# Patient Record
Sex: MALE | Race: WHITE | URBAN - METROPOLITAN AREA
[De-identification: names, ages, dates, MRNs, and addresses within clinical notes are randomized per-mention and may not be internally consistent; named-entity substitution may affect disease eponyms.]

---

## 2023-10-02 ENCOUNTER — HOSPITAL ENCOUNTER (EMERGENCY)
Facility: HOSPITAL | Age: 53
Discharge: HOME | End: 2023-10-02
Attending: EMERGENCY MEDICINE

## 2023-10-02 ENCOUNTER — APPOINTMENT (OUTPATIENT)
Dept: RADIOLOGY | Facility: HOSPITAL | Age: 53
End: 2023-10-02

## 2023-10-02 VITALS
SYSTOLIC BLOOD PRESSURE: 134 MMHG | OXYGEN SATURATION: 99 % | BODY MASS INDEX: 30.45 KG/M2 | WEIGHT: 194 LBS | DIASTOLIC BLOOD PRESSURE: 84 MMHG | HEIGHT: 67 IN | TEMPERATURE: 97.7 F | HEART RATE: 68 BPM | RESPIRATION RATE: 18 BRPM

## 2023-10-02 DIAGNOSIS — R07.89 OTHER CHEST PAIN: Primary | ICD-10-CM

## 2023-10-02 LAB
ALBUMIN SERPL-MCNC: 4 G/DL (ref 3.5–5)
ALP BLD-CCNC: 76 U/L (ref 35–125)
ALT SERPL-CCNC: 18 U/L (ref 5–40)
ANION GAP SERPL CALC-SCNC: 10 MMOL/L
AST SERPL-CCNC: 16 U/L (ref 5–40)
BASOPHILS # BLD AUTO: 0.07 X10*3/UL (ref 0–0.1)
BASOPHILS NFR BLD AUTO: 0.6 %
BILIRUB SERPL-MCNC: 0.2 MG/DL (ref 0.1–1.2)
BUN SERPL-MCNC: 9 MG/DL (ref 8–25)
CALCIUM SERPL-MCNC: 9.1 MG/DL (ref 8.5–10.4)
CHLORIDE SERPL-SCNC: 104 MMOL/L (ref 97–107)
CO2 SERPL-SCNC: 25 MMOL/L (ref 24–31)
CREAT SERPL-MCNC: 0.8 MG/DL (ref 0.4–1.6)
EOSINOPHIL # BLD AUTO: 0.08 X10*3/UL (ref 0–0.7)
EOSINOPHIL NFR BLD AUTO: 0.7 %
ERYTHROCYTE [DISTWIDTH] IN BLOOD BY AUTOMATED COUNT: 13.8 % (ref 11.5–14.5)
GFR SERPL CREATININE-BSD FRML MDRD: >90 ML/MIN/1.73M*2
GLUCOSE SERPL-MCNC: 94 MG/DL (ref 65–99)
HCT VFR BLD AUTO: 52.1 % (ref 41–52)
HGB BLD-MCNC: 17 G/DL (ref 13.5–17.5)
IMM GRANULOCYTES # BLD AUTO: 0.08 X10*3/UL (ref 0–0.7)
IMM GRANULOCYTES NFR BLD AUTO: 0.7 % (ref 0–0.9)
LIPASE SERPL-CCNC: <16 U/L (ref 16–63)
LYMPHOCYTES # BLD AUTO: 2.43 X10*3/UL (ref 1.2–4.8)
LYMPHOCYTES NFR BLD AUTO: 20.1 %
MCH RBC QN AUTO: 29.6 PG (ref 26–34)
MCHC RBC AUTO-ENTMCNC: 32.6 G/DL (ref 32–36)
MCV RBC AUTO: 91 FL (ref 80–100)
MONOCYTES # BLD AUTO: 0.81 X10*3/UL (ref 0.1–1)
MONOCYTES NFR BLD AUTO: 6.7 %
NEUTROPHILS # BLD AUTO: 8.62 X10*3/UL (ref 1.2–7.7)
NEUTROPHILS NFR BLD AUTO: 71.2 %
NRBC BLD-RTO: 0 /100 WBCS (ref 0–0)
PLATELET # BLD AUTO: 349 X10*3/UL (ref 150–450)
PMV BLD AUTO: 9.8 FL (ref 7.5–11.5)
POTASSIUM SERPL-SCNC: 4.4 MMOL/L (ref 3.4–5.1)
PROT SERPL-MCNC: 6.5 G/DL (ref 5.9–7.9)
RBC # BLD AUTO: 5.74 X10*6/UL (ref 4.5–5.9)
SODIUM SERPL-SCNC: 139 MMOL/L (ref 133–145)
TROPONIN T SERPL-MCNC: <6 NG/L
WBC # BLD AUTO: 12.1 X10*3/UL (ref 4.4–11.3)

## 2023-10-02 PROCEDURE — 85025 COMPLETE CBC W/AUTO DIFF WBC: CPT | Performed by: EMERGENCY MEDICINE

## 2023-10-02 PROCEDURE — 71046 X-RAY EXAM CHEST 2 VIEWS: CPT

## 2023-10-02 PROCEDURE — 36415 COLL VENOUS BLD VENIPUNCTURE: CPT | Performed by: EMERGENCY MEDICINE

## 2023-10-02 PROCEDURE — 2500000004 HC RX 250 GENERAL PHARMACY W/ HCPCS (ALT 636 FOR OP/ED): Performed by: EMERGENCY MEDICINE

## 2023-10-02 PROCEDURE — C9113 INJ PANTOPRAZOLE SODIUM, VIA: HCPCS | Performed by: EMERGENCY MEDICINE

## 2023-10-02 PROCEDURE — 99284 EMERGENCY DEPT VISIT MOD MDM: CPT | Performed by: EMERGENCY MEDICINE

## 2023-10-02 PROCEDURE — 84484 ASSAY OF TROPONIN QUANT: CPT | Performed by: EMERGENCY MEDICINE

## 2023-10-02 PROCEDURE — 99284 EMERGENCY DEPT VISIT MOD MDM: CPT | Mod: 25

## 2023-10-02 PROCEDURE — 2500000001 HC RX 250 WO HCPCS SELF ADMINISTERED DRUGS (ALT 637 FOR MEDICARE OP): Performed by: EMERGENCY MEDICINE

## 2023-10-02 PROCEDURE — 96374 THER/PROPH/DIAG INJ IV PUSH: CPT

## 2023-10-02 PROCEDURE — 80053 COMPREHEN METABOLIC PANEL: CPT | Performed by: EMERGENCY MEDICINE

## 2023-10-02 PROCEDURE — 83690 ASSAY OF LIPASE: CPT | Performed by: EMERGENCY MEDICINE

## 2023-10-02 RX ORDER — NAPROXEN SODIUM 220 MG/1
324 TABLET, FILM COATED ORAL ONCE
Status: COMPLETED | OUTPATIENT
Start: 2023-10-02 | End: 2023-10-02

## 2023-10-02 RX ORDER — PANTOPRAZOLE SODIUM 40 MG/10ML
40 INJECTION, POWDER, LYOPHILIZED, FOR SOLUTION INTRAVENOUS ONCE
Status: COMPLETED | OUTPATIENT
Start: 2023-10-02 | End: 2023-10-02

## 2023-10-02 RX ADMIN — PANTOPRAZOLE SODIUM 40 MG: 40 INJECTION, POWDER, FOR SOLUTION INTRAVENOUS at 09:24

## 2023-10-02 RX ADMIN — ASPIRIN 81 MG CHEWABLE TABLET 324 MG: 81 TABLET CHEWABLE at 09:24

## 2023-10-02 ASSESSMENT — PAIN DESCRIPTION - DESCRIPTORS
DESCRIPTORS: CRAMPING
DESCRIPTORS: SHARP

## 2023-10-02 ASSESSMENT — LIFESTYLE VARIABLES
HAVE PEOPLE ANNOYED YOU BY CRITICIZING YOUR DRINKING: NO
EVER HAD A DRINK FIRST THING IN THE MORNING TO STEADY YOUR NERVES TO GET RID OF A HANGOVER: NO
HAVE YOU EVER FELT YOU SHOULD CUT DOWN ON YOUR DRINKING: NO
EVER FELT BAD OR GUILTY ABOUT YOUR DRINKING: NO

## 2023-10-02 ASSESSMENT — COLUMBIA-SUICIDE SEVERITY RATING SCALE - C-SSRS
2. HAVE YOU ACTUALLY HAD ANY THOUGHTS OF KILLING YOURSELF?: NO
1. IN THE PAST MONTH, HAVE YOU WISHED YOU WERE DEAD OR WISHED YOU COULD GO TO SLEEP AND NOT WAKE UP?: NO
6. HAVE YOU EVER DONE ANYTHING, STARTED TO DO ANYTHING, OR PREPARED TO DO ANYTHING TO END YOUR LIFE?: NO

## 2023-10-02 ASSESSMENT — PAIN SCALES - GENERAL
PAINLEVEL_OUTOF10: 0 - NO PAIN
PAINLEVEL_OUTOF10: 0 - NO PAIN
PAINLEVEL_OUTOF10: 7

## 2023-10-02 ASSESSMENT — PAIN DESCRIPTION - LOCATION: LOCATION: CHEST

## 2023-10-02 ASSESSMENT — PAIN DESCRIPTION - ORIENTATION: ORIENTATION: LEFT

## 2023-10-02 ASSESSMENT — PAIN DESCRIPTION - DIRECTION: RADIATING_TOWARDS: LEFT SIDE OF CHEST

## 2023-10-02 ASSESSMENT — PAIN DESCRIPTION - PROGRESSION: CLINICAL_PROGRESSION: NOT CHANGED

## 2023-10-02 ASSESSMENT — PAIN DESCRIPTION - ONSET: ONSET: ONGOING

## 2023-10-02 ASSESSMENT — PAIN - FUNCTIONAL ASSESSMENT
PAIN_FUNCTIONAL_ASSESSMENT: 0-10
PAIN_FUNCTIONAL_ASSESSMENT: 0-10

## 2023-10-02 ASSESSMENT — PAIN DESCRIPTION - FREQUENCY: FREQUENCY: CONSTANT/CONTINUOUS

## 2023-10-02 ASSESSMENT — PAIN DESCRIPTION - PAIN TYPE: TYPE: ACUTE PAIN

## 2023-10-02 NOTE — ED PROVIDER NOTES
"HPI   Chief Complaint   Patient presents with    Chest Pain     Chest pain, started 1 hour ago, pointing to left side of chest.  Pain 7/10.  \"Feels like a Sandeep Horse\"       53-year-old male presenting to emergency department with a chief complaint of 1 hour of chest discomfort.  More in the epigastric region.  Denies any significant cardiac history.  He is a smoker.  Denies lightness dizziness shortness of breath numbness weakness.  All appear nontoxic. no Other complaint.                          No data recorded                Patient History   History reviewed. No pertinent past medical history.  Past Surgical History:   Procedure Laterality Date    HERNIA REPAIR       No family history on file.  Social History     Tobacco Use    Smoking status: Every Day     Packs/day: 1.00     Years: 37.00     Additional pack years: 0.00     Total pack years: 37.00     Types: Cigarettes    Smokeless tobacco: Never   Vaping Use    Vaping Use: Never used   Substance Use Topics    Alcohol use: Not on file    Drug use: Never       Physical Exam   ED Triage Vitals [10/02/23 0858]   Temp Heart Rate Resp BP   36.5 °C (97.7 °F) 86 20 132/87      SpO2 Temp Source Heart Rate Source Patient Position   100 % Temporal Monitor Sitting      BP Location FiO2 (%)     Left arm --       Physical Exam  Constitutional:       Appearance: He is well-developed and normal weight.   HENT:      Head: Normocephalic and atraumatic.   Cardiovascular:      Rate and Rhythm: Normal rate and regular rhythm.      Heart sounds: Normal heart sounds.   Pulmonary:      Effort: Pulmonary effort is normal.      Breath sounds: Normal breath sounds.   Abdominal:      General: Bowel sounds are normal.      Palpations: Abdomen is soft.   Musculoskeletal:         General: Normal range of motion.      Cervical back: Normal range of motion.   Skin:     General: Skin is warm and dry.   Neurological:      General: No focal deficit present.      Mental Status: He is alert " and oriented to person, place, and time.   Psychiatric:         Mood and Affect: Mood normal.         ED Course & MDM   ED Course as of 10/02/23 1534   Mon Oct 02, 2023   1247 Troponin T, High Sensitivity: <6 [VALENTINO]      ED Course User Index  [VALENTINO] Danielle Meek MD         Diagnoses as of 10/02/23 1534   Other chest pain       Medical Decision Making  CBC without significant leukocytosis or anemia, metabolic panel without significant renal impairment or electrolyte abnormality.  Troponin within an appropriate range without significant delta change, chest x-ray without actionable cardiopulmonary process, EKG without evidence of acute ischemia.  Patient with heart score less than 3.  Referred to cardiology.  Released in good condition.    Amount and/or Complexity of Data Reviewed  Independent Historian: parent  Labs: ordered.  Radiology: ordered and independent interpretation performed.  ECG/medicine tests: ordered and independent interpretation performed.        Procedure  Procedures     Lester Leon PA-C  10/02/23 1533

## 2023-10-02 NOTE — Clinical Note
Zoltan Blue was seen and treated in our emergency department on 10/2/2023.  He may return to work on 10/03/2023.       If you have any questions or concerns, please don't hesitate to call.      Danielle Meek MD

## 2023-10-02 NOTE — ED TRIAGE NOTES
"Patient presents to ED with left sided chest pain, stated while driving, he developed left sided chest pain, referred to pain as a \"Sandeep Horse\" in chest.  Felt nauseated.  "

## 2023-10-02 NOTE — ED PROVIDER NOTES
The patient was seen in conjunction with the advanced practice provider, and I performed a substantive portion of the visit.  All medical decision making was performed by me.  If applicable, all laboratory studies, radiology, and EKGs were reviewed by me.     History: 53-year-old male with no significant past medical history, daily smoker presents for evaluation of left-sided chest pain that he describes as a squeezing sensation associated with some mild shortness of breath and nausea while he was driving today.  States it has been ongoing for the past 1 hour.  No cardiac history.  States initially he thought it might be heartburn as he is having some epigastric discomfort as well.  Physical exam:  Constitutional:  Awake, alert, well appearing, nontoxic  HEENT:  Normocephalic, atraumatic  Neck: Trachea midline, no stridor  Respiratory/Chest:  Clear to auscultation bilaterally, no wheezes, rhonchi, or rales  CV:  Regular rate and regular rhythm, no murmurs, gallops, or rubs  Abdomen:  Soft, tenderness in the epigastrium and left subcostal region, nondistended, normal bowel sounds, no peritoneal signs  Skin:  Warm and dry  EKG on my independent review: Normal sinus rhythm 80 bpm, normal axis, normal intervals, no acute ST or T wave abnormalities    MDM: Patient presents for chest pain.  History obtained from the patient. Based on patient history, exam, and workup I estimate there is a low risk for diagnostic considerations including, but not limited to pulmonary embolism, aortic dissection, pneumothorax.  ACS one of the highest considerations.  Patient has nondiagnostic EKG, troponin negative x2.  Chest x-ray on my independent review and compare by Radiology with no acute cardiopulmonary process.  I have considered admission/hospitalization for further workup and management of acute coronary syndrome patient has  low risk heart score of 2 thus I considered the discharge disposition reasonable.      Suspected diagnosis  and risks discussed with the patient including the utility of the Heart score, and after shared decision-making discussion agreed with discharging home to follow up closely with primary care doctor or cardiologist.  Also discussed return instructions including the symptoms which are most concerning including difficulty breathing, exertional chest pain associated with SOB, nausea, or diaphoresis, and changing or worsening symptoms that would necessitate immediate return.        Danielle Meek MD  10/02/23 0241

## 2023-10-11 ENCOUNTER — HOSPITAL ENCOUNTER (OUTPATIENT)
Dept: CARDIOLOGY | Facility: HOSPITAL | Age: 53
Discharge: HOME | End: 2023-10-11

## 2023-10-11 LAB
ATRIAL RATE: 80 BPM
P AXIS: 64 DEGREES
P OFFSET: 217 MS
P ONSET: 152 MS
PR INTERVAL: 152 MS
Q ONSET: 228 MS
QRS COUNT: 13 BEATS
QRS DURATION: 80 MS
QT INTERVAL: 366 MS
QTC CALCULATION(BAZETT): 422 MS
QTC FREDERICIA: 403 MS
R AXIS: 64 DEGREES
T AXIS: 48 DEGREES
T OFFSET: 411 MS
VENTRICULAR RATE: 80 BPM

## 2023-10-11 PROCEDURE — 93005 ELECTROCARDIOGRAM TRACING: CPT

## 2024-05-27 ENCOUNTER — HOSPITAL ENCOUNTER (EMERGENCY)
Facility: HOSPITAL | Age: 54
Discharge: HOME | End: 2024-05-27
Attending: STUDENT IN AN ORGANIZED HEALTH CARE EDUCATION/TRAINING PROGRAM

## 2024-05-27 VITALS
RESPIRATION RATE: 18 BRPM | HEART RATE: 74 BPM | TEMPERATURE: 97.3 F | HEIGHT: 67 IN | DIASTOLIC BLOOD PRESSURE: 87 MMHG | BODY MASS INDEX: 28.25 KG/M2 | WEIGHT: 180 LBS | SYSTOLIC BLOOD PRESSURE: 127 MMHG | OXYGEN SATURATION: 99 %

## 2024-05-27 DIAGNOSIS — F07.81 POST CONCUSSIVE SYNDROME: Primary | ICD-10-CM

## 2024-05-27 PROCEDURE — 99283 EMERGENCY DEPT VISIT LOW MDM: CPT

## 2024-05-27 PROCEDURE — 99284 EMERGENCY DEPT VISIT MOD MDM: CPT | Performed by: STUDENT IN AN ORGANIZED HEALTH CARE EDUCATION/TRAINING PROGRAM

## 2024-05-27 PROCEDURE — 2500000001 HC RX 250 WO HCPCS SELF ADMINISTERED DRUGS (ALT 637 FOR MEDICARE OP)

## 2024-05-27 RX ORDER — SUMATRIPTAN 50 MG/1
50 TABLET, FILM COATED ORAL ONCE
Status: COMPLETED | OUTPATIENT
Start: 2024-05-27 | End: 2024-05-27

## 2024-05-27 RX ORDER — BUTALBITAL, ACETAMINOPHEN AND CAFFEINE 50; 325; 40 MG/1; MG/1; MG/1
1 TABLET ORAL EVERY 6 HOURS PRN
Qty: 3 TABLET | Refills: 0 | Status: SHIPPED | OUTPATIENT
Start: 2024-05-27

## 2024-05-27 RX ORDER — ONDANSETRON 4 MG/1
4 TABLET, ORALLY DISINTEGRATING ORAL EVERY 8 HOURS PRN
Qty: 20 TABLET | Refills: 0 | Status: SHIPPED | OUTPATIENT
Start: 2024-05-27 | End: 2024-06-03

## 2024-05-27 RX ORDER — IBUPROFEN 600 MG/1
600 TABLET ORAL ONCE
Status: COMPLETED | OUTPATIENT
Start: 2024-05-27 | End: 2024-05-27

## 2024-05-27 RX ADMIN — SUMATRIPTAN SUCCINATE 50 MG: 50 TABLET ORAL at 13:03

## 2024-05-27 RX ADMIN — IBUPROFEN 600 MG: 600 TABLET, FILM COATED ORAL at 12:22

## 2024-05-27 ASSESSMENT — PAIN SCALES - GENERAL
PAINLEVEL_OUTOF10: 8
PAINLEVEL_OUTOF10: 9
PAINLEVEL_OUTOF10: 9

## 2024-05-27 ASSESSMENT — PAIN DESCRIPTION - PAIN TYPE: TYPE: ACUTE PAIN

## 2024-05-27 ASSESSMENT — LIFESTYLE VARIABLES
HAVE YOU EVER FELT YOU SHOULD CUT DOWN ON YOUR DRINKING: NO
EVER FELT BAD OR GUILTY ABOUT YOUR DRINKING: NO
TOTAL SCORE: 0
EVER HAD A DRINK FIRST THING IN THE MORNING TO STEADY YOUR NERVES TO GET RID OF A HANGOVER: NO
HAVE PEOPLE ANNOYED YOU BY CRITICIZING YOUR DRINKING: NO

## 2024-05-27 ASSESSMENT — PAIN - FUNCTIONAL ASSESSMENT
PAIN_FUNCTIONAL_ASSESSMENT: 0-10
PAIN_FUNCTIONAL_ASSESSMENT: 0-10

## 2024-05-27 ASSESSMENT — PAIN DESCRIPTION - ORIENTATION: ORIENTATION: POSTERIOR

## 2024-05-27 ASSESSMENT — COLUMBIA-SUICIDE SEVERITY RATING SCALE - C-SSRS
6. HAVE YOU EVER DONE ANYTHING, STARTED TO DO ANYTHING, OR PREPARED TO DO ANYTHING TO END YOUR LIFE?: NO
1. IN THE PAST MONTH, HAVE YOU WISHED YOU WERE DEAD OR WISHED YOU COULD GO TO SLEEP AND NOT WAKE UP?: NO
2. HAVE YOU ACTUALLY HAD ANY THOUGHTS OF KILLING YOURSELF?: NO

## 2024-05-27 ASSESSMENT — PAIN DESCRIPTION - LOCATION
LOCATION: HEAD
LOCATION: HEAD

## 2024-05-27 NOTE — Clinical Note
Zoltan Blue was seen and treated in our emergency department on 5/27/2024.  He may return to work on 06/03/2024.  He was diagnosed with post-concussive syndrome and will need re-evaluation by a physician to clear patient for return to work.     If you have any questions or concerns, please don't hesitate to call.      Sammy Yipoffer, DO

## 2024-05-27 NOTE — ED PROVIDER NOTES
HPI   Chief Complaint   Patient presents with    Headache     Pt states that he hit his head 2 weeks ago. Was seen at [place] Saturday. Reports worsening headache.          HPI  Patient is a 54-year-old male presenting to [facility] ED with a variety of symptoms including headache, blurred vision, lightheadedness, nausea, and intermittent shortness of breath.  [place]  a bag and slammed the back of his head into the top of the door.  He has had intermittent headaches since then and worsening visual symptoms and brain fog.  He was seen at [facility] on 5/25 where they did a CT head which was negative as well as a cardiac workup that was negative.  He was diagnosed with postconcussive syndrome.  They attempted to try Tylenol but did not help his headache.  He was discharged with instructions to follow-up with neurology.  His symptoms have not gotten any better and came back to the emergency department.  He is currently not having any chest pain, shortness of breath, weakness or sensory changes, nausea or vomiting.                  [place] Coma Scale Score: 15                     Patient History   History reviewed. No pertinent past medical history.  Past Surgical History:   Procedure Laterality Date    HERNIA REPAIR       No family history on file.  Social History     Tobacco Use    Smoking status: Every Day     Current packs/day: 1.00     Average packs/day: 1 pack/day for 37.0 years (37.0 ttl pk-yrs)     Types: Cigarettes    Smokeless tobacco: Never   Vaping Use    Vaping status: Never Used   Substance Use Topics    Alcohol use: Never    Drug use: Never       Physical Exam   ED Triage Vitals [05/27/24 1119]   Temperature Heart Rate Respirations BP   36.3 °C (97.3 °F) 80 18 140/83      Pulse Ox Temp Source Heart Rate Source Patient Position   100 % Temporal -- Sitting      BP Location FiO2 (%)     Right arm --       Physical  Exam  Constitutional: A&Ox4, NAD, resting comfortable   Head and Face: Atraumatic, normocephalic   Eyes: Normal external exam, EOMI  ENT: Normal external inspection of ears and nose. Oropharynx normal.  Cardiovascular: RRR, S1/S2, no murmurs, rubs, or gallops, radial pulses +2  Pulmonary: CTAB, no respiratory distress, no wheezing, rales or rhonchi, on RA  Abdomen: +BS, soft, non-tender, nondistended, no guarding rigidity or rebound tenderness, no masses noted  MSK: Negative for edema, No joint swelling, normal movements of all extremities.   Neuro: No nystagmus, cranial nerves II through XII intact grossly, no limb ataxia, gait is stable and normal, no focal deficits, normal motor function, normal sensation, follows all commands  Skin- No lesions, contusions, or erythema.  Psychiatric: Judgment intact. Appropriate mood, affect and behavior    ED Course & MDM   Diagnoses as of 05/27/24 1222   Post concussive syndrome   Patient is a 54-year-old male presenting to [facility] ED with a variety of symptoms including headache, blurred vision, lightheadedness, nausea, and intermittent shortness of breath.  Patient's symptoms most consistent with continued post concussion syndrome.    Patient presented hemodynamically stable.  Was seen at [facility] 5/25 where they did an extensive workup including a CT head which was negative for any acute intracranial pathology as well as chest x-ray and cardiac workup which was negative.  They did a D-dimer and duplex ultrasound which was negative ruling out any PE with patient's atypical complaints of chest pain and shortness of breath at the hospital there.  He is not having any complaints of chest pain today.  Will trial patient on Motrin and sumatriptan which did not help his headache.  He was given reassurance that concussion will resolve with time but he will need follow up with Neurology concussion clinic. Patient is not safe to return to work as he is a  given high risk  job.  He will need to have follow-up with a neurologist or a concussion specialist or his primary care physician to be reevaluated to be safe for return to work.    Medical Decision Making      Procedure  Procedures     Sammy Hostoffer,   Resident  05/27/24 1156       Sammy Hostoffer, DO  Resident  05/27/24 1223       Sammy Hostoffer, DO  Resident  05/27/24 1323

## 2024-05-27 NOTE — DISCHARGE INSTRUCTIONS
Please take medications as prescribed.  Please return to the emergency department if you experience any chest pain, shortness of breath, weakness, numbness or tingling, intractable headache or nausea vomiting.  Please follow-up with the concussion clinic.

## 2024-09-09 ENCOUNTER — HOSPITAL ENCOUNTER (EMERGENCY)
Facility: HOSPITAL | Age: 54
Discharge: HOME | End: 2024-09-10
Payer: MEDICARE

## 2024-09-09 ENCOUNTER — APPOINTMENT (OUTPATIENT)
Dept: RADIOLOGY | Facility: HOSPITAL | Age: 54
End: 2024-09-09
Payer: MEDICARE

## 2024-09-09 VITALS
WEIGHT: 183.5 LBS | HEIGHT: 67 IN | TEMPERATURE: 97.9 F | DIASTOLIC BLOOD PRESSURE: 85 MMHG | OXYGEN SATURATION: 99 % | SYSTOLIC BLOOD PRESSURE: 131 MMHG | RESPIRATION RATE: 16 BRPM | HEART RATE: 54 BPM | BODY MASS INDEX: 28.8 KG/M2

## 2024-09-09 DIAGNOSIS — R51.9 ACUTE NONINTRACTABLE HEADACHE, UNSPECIFIED HEADACHE TYPE: Primary | ICD-10-CM

## 2024-09-09 LAB
ALBUMIN SERPL-MCNC: 3.8 G/DL (ref 3.5–5)
ALP BLD-CCNC: 67 U/L (ref 35–125)
ALT SERPL-CCNC: 30 U/L (ref 5–40)
ANION GAP SERPL CALC-SCNC: 12 MMOL/L
APPEARANCE UR: CLEAR
AST SERPL-CCNC: 15 U/L (ref 5–40)
BASOPHILS # BLD AUTO: 0.05 X10*3/UL (ref 0–0.1)
BASOPHILS NFR BLD AUTO: 0.3 %
BILIRUB SERPL-MCNC: 0.3 MG/DL (ref 0.1–1.2)
BILIRUB UR STRIP.AUTO-MCNC: NEGATIVE MG/DL
BUN SERPL-MCNC: 14 MG/DL (ref 8–25)
CALCIUM SERPL-MCNC: 9 MG/DL (ref 8.5–10.4)
CHLORIDE SERPL-SCNC: 98 MMOL/L (ref 97–107)
CO2 SERPL-SCNC: 29 MMOL/L (ref 24–31)
COLOR UR: NORMAL
CREAT SERPL-MCNC: 0.9 MG/DL (ref 0.4–1.6)
EGFRCR SERPLBLD CKD-EPI 2021: >90 ML/MIN/1.73M*2
EOSINOPHIL # BLD AUTO: 0 X10*3/UL (ref 0–0.7)
EOSINOPHIL NFR BLD AUTO: 0 %
ERYTHROCYTE [DISTWIDTH] IN BLOOD BY AUTOMATED COUNT: 13.9 % (ref 11.5–14.5)
GLUCOSE SERPL-MCNC: 121 MG/DL (ref 65–99)
GLUCOSE UR STRIP.AUTO-MCNC: NORMAL MG/DL
HCT VFR BLD AUTO: 47.4 % (ref 41–52)
HGB BLD-MCNC: 15.4 G/DL (ref 13.5–17.5)
IMM GRANULOCYTES # BLD AUTO: 0.48 X10*3/UL (ref 0–0.7)
IMM GRANULOCYTES NFR BLD AUTO: 2.7 % (ref 0–0.9)
KETONES UR STRIP.AUTO-MCNC: NEGATIVE MG/DL
LEUKOCYTE ESTERASE UR QL STRIP.AUTO: NEGATIVE
LYMPHOCYTES # BLD AUTO: 1.22 X10*3/UL (ref 1.2–4.8)
LYMPHOCYTES NFR BLD AUTO: 6.9 %
MAGNESIUM SERPL-MCNC: 2 MG/DL (ref 1.6–3.1)
MCH RBC QN AUTO: 29.7 PG (ref 26–34)
MCHC RBC AUTO-ENTMCNC: 32.5 G/DL (ref 32–36)
MCV RBC AUTO: 91 FL (ref 80–100)
MONOCYTES # BLD AUTO: 1.22 X10*3/UL (ref 0.1–1)
MONOCYTES NFR BLD AUTO: 6.9 %
NEUTROPHILS # BLD AUTO: 14.69 X10*3/UL (ref 1.2–7.7)
NEUTROPHILS NFR BLD AUTO: 83.2 %
NITRITE UR QL STRIP.AUTO: NEGATIVE
NRBC BLD-RTO: 0 /100 WBCS (ref 0–0)
PH UR STRIP.AUTO: 6 [PH]
PLATELET # BLD AUTO: 320 X10*3/UL (ref 150–450)
POTASSIUM SERPL-SCNC: 4.6 MMOL/L (ref 3.4–5.1)
PROT SERPL-MCNC: 5.8 G/DL (ref 5.9–7.9)
PROT UR STRIP.AUTO-MCNC: NEGATIVE MG/DL
RBC # BLD AUTO: 5.19 X10*6/UL (ref 4.5–5.9)
RBC # UR STRIP.AUTO: NEGATIVE /UL
SODIUM SERPL-SCNC: 139 MMOL/L (ref 133–145)
SP GR UR STRIP.AUTO: 1.02
TROPONIN T SERPL-MCNC: <6 NG/L
UROBILINOGEN UR STRIP.AUTO-MCNC: NORMAL MG/DL
WBC # BLD AUTO: 17.7 X10*3/UL (ref 4.4–11.3)

## 2024-09-09 PROCEDURE — 84484 ASSAY OF TROPONIN QUANT: CPT | Performed by: PHYSICIAN ASSISTANT

## 2024-09-09 PROCEDURE — 96375 TX/PRO/DX INJ NEW DRUG ADDON: CPT | Mod: 59

## 2024-09-09 PROCEDURE — 36415 COLL VENOUS BLD VENIPUNCTURE: CPT | Performed by: PHYSICIAN ASSISTANT

## 2024-09-09 PROCEDURE — 70498 CT ANGIOGRAPHY NECK: CPT | Performed by: SURGERY

## 2024-09-09 PROCEDURE — 70496 CT ANGIOGRAPHY HEAD: CPT | Performed by: SURGERY

## 2024-09-09 PROCEDURE — 80053 COMPREHEN METABOLIC PANEL: CPT | Performed by: PHYSICIAN ASSISTANT

## 2024-09-09 PROCEDURE — 2500000001 HC RX 250 WO HCPCS SELF ADMINISTERED DRUGS (ALT 637 FOR MEDICARE OP)

## 2024-09-09 PROCEDURE — 85025 COMPLETE CBC W/AUTO DIFF WBC: CPT | Performed by: PHYSICIAN ASSISTANT

## 2024-09-09 PROCEDURE — 2500000004 HC RX 250 GENERAL PHARMACY W/ HCPCS (ALT 636 FOR OP/ED)

## 2024-09-09 PROCEDURE — 83735 ASSAY OF MAGNESIUM: CPT | Performed by: PHYSICIAN ASSISTANT

## 2024-09-09 PROCEDURE — 96361 HYDRATE IV INFUSION ADD-ON: CPT

## 2024-09-09 PROCEDURE — 96374 THER/PROPH/DIAG INJ IV PUSH: CPT | Mod: 59

## 2024-09-09 PROCEDURE — 70498 CT ANGIOGRAPHY NECK: CPT

## 2024-09-09 PROCEDURE — 81003 URINALYSIS AUTO W/O SCOPE: CPT | Performed by: PHYSICIAN ASSISTANT

## 2024-09-09 PROCEDURE — 2550000001 HC RX 255 CONTRASTS

## 2024-09-09 PROCEDURE — 99284 EMERGENCY DEPT VISIT MOD MDM: CPT | Mod: 25

## 2024-09-09 RX ORDER — METOCLOPRAMIDE HYDROCHLORIDE 5 MG/ML
10 INJECTION INTRAMUSCULAR; INTRAVENOUS ONCE
Status: COMPLETED | OUTPATIENT
Start: 2024-09-09 | End: 2024-09-09

## 2024-09-09 RX ORDER — KETOROLAC TROMETHAMINE 30 MG/ML
30 INJECTION, SOLUTION INTRAMUSCULAR; INTRAVENOUS ONCE
Status: COMPLETED | OUTPATIENT
Start: 2024-09-09 | End: 2024-09-09

## 2024-09-09 RX ORDER — OXYCODONE AND ACETAMINOPHEN 5; 325 MG/1; MG/1
2 TABLET ORAL ONCE
Status: COMPLETED | OUTPATIENT
Start: 2024-09-09 | End: 2024-09-09

## 2024-09-09 RX ORDER — DIPHENHYDRAMINE HYDROCHLORIDE 50 MG/ML
25 INJECTION INTRAMUSCULAR; INTRAVENOUS ONCE
Status: COMPLETED | OUTPATIENT
Start: 2024-09-09 | End: 2024-09-09

## 2024-09-09 ASSESSMENT — PAIN SCALES - GENERAL
PAINLEVEL_OUTOF10: 10 - WORST POSSIBLE PAIN
PAINLEVEL_OUTOF10: 10 - WORST POSSIBLE PAIN
PAINLEVEL_OUTOF10: 8
PAINLEVEL_OUTOF10: 10 - WORST POSSIBLE PAIN

## 2024-09-09 ASSESSMENT — LIFESTYLE VARIABLES
TOTAL SCORE: 0
EVER HAD A DRINK FIRST THING IN THE MORNING TO STEADY YOUR NERVES TO GET RID OF A HANGOVER: NO
HAVE PEOPLE ANNOYED YOU BY CRITICIZING YOUR DRINKING: NO
HAVE YOU EVER FELT YOU SHOULD CUT DOWN ON YOUR DRINKING: NO
EVER FELT BAD OR GUILTY ABOUT YOUR DRINKING: NO

## 2024-09-09 ASSESSMENT — PAIN DESCRIPTION - LOCATION
LOCATION: HEAD
LOCATION: HEAD

## 2024-09-09 ASSESSMENT — PAIN DESCRIPTION - PAIN TYPE
TYPE: ACUTE PAIN
TYPE: ACUTE PAIN

## 2024-09-09 ASSESSMENT — PAIN - FUNCTIONAL ASSESSMENT
PAIN_FUNCTIONAL_ASSESSMENT: 0-10
PAIN_FUNCTIONAL_ASSESSMENT: 0-10

## 2024-09-09 ASSESSMENT — PAIN SCALES - PAIN ASSESSMENT IN ADVANCED DEMENTIA (PAINAD): TOTALSCORE: MEDICATION (SEE MAR)

## 2024-09-10 LAB
HOLD SPECIMEN: NORMAL
TROPONIN T SERPL-MCNC: <6 NG/L

## 2024-09-10 RX ORDER — DIPHENHYDRAMINE HCL 25 MG
25 TABLET ORAL EVERY 6 HOURS
Qty: 20 TABLET | Refills: 0 | Status: SHIPPED | OUTPATIENT
Start: 2024-09-10 | End: 2024-09-15

## 2024-09-10 RX ORDER — KETOROLAC TROMETHAMINE 10 MG/1
10 TABLET, FILM COATED ORAL EVERY 6 HOURS PRN
Qty: 20 TABLET | Refills: 0 | Status: SHIPPED | OUTPATIENT
Start: 2024-09-10 | End: 2024-09-15

## 2024-09-10 RX ORDER — METOCLOPRAMIDE 10 MG/1
10 TABLET ORAL EVERY 6 HOURS
Qty: 28 TABLET | Refills: 0 | Status: SHIPPED | OUTPATIENT
Start: 2024-09-10 | End: 2024-09-17

## 2024-09-10 NOTE — ED PROVIDER NOTES
[facility]   Chief Complaint   Patient presents with    Headache     Patient reports being admitted to [facility] for the past 4 days, discharged this morning at 11am.  Patient states he came in with severe headache, dizziness and off balance then an MRI was done and they found a 2.9mm cyst on the back of his brain.  Patient was not feeling better before discharge this morning but was discharged and still having the same symptoms.        HPI  Patient is a 54-year-old male who presents to ED for chronic dizziness.  Patient was discharged from the hospital today.  He was admitted for dizziness, vision changes, headache.  Patient is well-appearing and comfortable appearing, in no acute distress.  Patient states he has a severe headache.  He is watching TV comfortably.  Patient states he was not feeling right when he was discharged from the hospital which prompted him to come to the ED for further evaluation.  Patient has an NIH of 0.  He has no neurologic deficits.  He denies any nausea or vomiting.  He is ambulating well without assistance.  Patient is very aggravated.  He is somewhat rude and argumentative in conversation.      Patient History   No past medical history on file.  Past Surgical History:   Procedure Laterality Date    HERNIA REPAIR       No family history on file.  Social History     Tobacco Use    Smoking status: Every Day     Current packs/day: 1.00     Average packs/day: 1 pack/day for 37.0 years (37.0 ttl pk-yrs)     Types: Cigarettes    Smokeless tobacco: Never   Vaping Use    Vaping status: Never Used   Substance Use Topics    Alcohol use: Never    Drug use: Never       Physical Exam   ED Triage Vitals [09/09/24 1943]   Temperature Heart Rate Respirations BP   36.6 °C (97.9 °F) 73 16 136/89      Pulse Ox Temp Source Heart Rate Source Patient Position   100 % Temporal Monitor Sitting      BP Location FiO2 (%)     Left arm --       Physical Exam  Vitals reviewed.   Constitutional:       General: He is not  in acute distress.     Appearance: Normal appearance. He is not ill-appearing.   HENT:      Head: Normocephalic and atraumatic.   Eyes:      Extraocular Movements: Extraocular movements intact.   Cardiovascular:      Rate and Rhythm: Normal rate and regular rhythm.      Heart sounds: Normal heart sounds.   Pulmonary:      Effort: Pulmonary effort is normal.      Breath sounds: Normal breath sounds.   Abdominal:      General: Abdomen is flat.      Palpations: Abdomen is soft.      Tenderness: There is no abdominal tenderness.   Musculoskeletal:         General: Normal range of motion.      Cervical back: Normal range of motion and neck supple.   Skin:     General: Skin is warm and dry.   Neurological:      General: No focal deficit present.      Mental Status: He is alert and oriented to person, place, and time.   Psychiatric:         Mood and Affect: Mood normal.         Behavior: Behavior normal.           ED Course & MDM   Diagnoses as of 09/10/24 0021   Acute nonintractable headache, unspecified headache type                 No data recorded     [place]abe Coma Scale Score: 15 (09/09/24 2205 : Beatrice Martínez LPN)                           Medical Decision Making  Parts of this chart have been completed using voice recognition software. Please excuse any errors of transcription.  My thought process and reason for plan has been formulated from the time that I saw the patient until the time of disposition and is not specific to one specific moment during their visit and furthermore my MDM encompasses this entire chart and not only this text box.    HPI:   Detailed above.    Exam:   A medically appropriate exam performed, outlined above, given the known history and presentation.    History obtained from:   Patient    EKG/Cardiac monitor:   Interpreted by attending physician, see their note for ED course for more detail.    Social Determinants of Health considered during this visit:       Medications given during  visit:  Medications   sodium chloride 0.9 % bolus 1,000 mL (0 mL intravenous Stopped 9/10/24 0004)   ketorolac (Toradol) injection 30 mg (30 mg intravenous Given 9/9/24 2222)   diphenhydrAMINE (BENADryl) injection 25 mg (25 mg intravenous Given 9/9/24 2223)   metoclopramide (Reglan) injection 10 mg (10 mg intravenous Given 9/9/24 2225)   oxyCODONE-acetaminophen (Percocet) 5-325 mg per tablet 2 tablet (2 tablets oral Given 9/9/24 2231)   iohexol (OMNIPaque) 350 mg iodine/mL solution 75 mL (75 mL intravenous Given 9/9/24 2303)        Diagnostic/tests:  Labs Reviewed   CBC WITH AUTO DIFFERENTIAL - Abnormal       Result Value    WBC 17.7 (*)     nRBC 0.0      RBC 5.19      Hemoglobin 15.4      Hematocrit 47.4      MCV 91      MCH 29.7      MCHC 32.5      RDW 13.9      Platelets 320      Neutrophils % 83.2      Immature Granulocytes %, Automated 2.7 (*)     Lymphocytes % 6.9      Monocytes % 6.9      Eosinophils % 0.0      Basophils % 0.3      Neutrophils Absolute 14.69 (*)     Immature Granulocytes Absolute, Automated 0.48      Lymphocytes Absolute 1.22      Monocytes Absolute 1.22 (*)     Eosinophils Absolute 0.00      Basophils Absolute 0.05     COMPREHENSIVE METABOLIC PANEL - Abnormal    Glucose 121 (*)     Sodium 139      Potassium 4.6      Chloride 98      Bicarbonate 29      Urea Nitrogen 14      Creatinine 0.90      eGFR >90      Calcium 9.0      Albumin 3.8      Alkaline Phosphatase 67      Total Protein 5.8 (*)     AST 15      Bilirubin, Total 0.3      ALT 30      Anion Gap 12     MAGNESIUM - Normal    Magnesium 2.00     URINALYSIS WITH REFLEX CULTURE AND MICROSCOPIC - Normal    Color, Urine Light-Yellow      Appearance, Urine Clear      Specific Gravity, Urine 1.017      pH, Urine 6.0      Protein, Urine NEGATIVE      Glucose, Urine Normal      Blood, Urine NEGATIVE      Ketones, Urine NEGATIVE      Bilirubin, Urine NEGATIVE      Urobilinogen, Urine Normal      Nitrite, Urine NEGATIVE      Leukocyte Esterase,  Urine NEGATIVE     SERIAL TROPONIN, INITIAL ([facility]) - Normal    Troponin T, High Sensitivity <6     URINALYSIS WITH REFLEX CULTURE AND MICROSCOPIC    Narrative:     The following orders were created for panel order Urinalysis with Reflex Culture and Microscopic.  Procedure                               Abnormality         Status                     ---------                               -----------         ------                     Urinalysis with Reflex C...[[id number]]  Normal              Final result               Extra Urine Gray Tube[[id number]]                            In process                   Please view results for these tests on the individual orders.   TROPONIN T SERIES, HIGH SENSITIVITY (0, 2 HR, 6 HR)    Narrative:     The following orders were created for panel order Troponin T Series, High Sensitivity (0, 2HR, 6HR).  Procedure                               Abnormality         Status                     ---------                               -----------         ------                     Serial Troponin, Initial...[[id number]]  Normal              Final result               Serial Troponin, 2 Hour ...[[id number]]                      In process                 Serial Troponin, 6 Hour ...[[id number]]                                                   Please view results for these tests on the individual orders.   EXTRA URINE GRAY TUBE   SERIAL TROPONIN,  2 HOUR ([facility])   SERIAL TROPONIN, 6 HOUR ([facility])      CT angio head and neck w and wo IV contrast   Final Result   No evidence of acute intracranial abnormality on noncontrast head CT.        No evidence for significant stenosis of the cervical vessels.        No evidence for significant stenosis or large branch vessel cutoffs   of the intracranial vessels.        MACRO:   None        Signed by: Errol Bello 9/9/2024 11:50 PM   Dictation workstation:   [id number]             [facility] Summary:  No evidence of acute abnormality on CT of head.  Lab work is  unremarkable today.  Patient is safe for discharge at this time.  He already has a follow-up with his neurologist.  He should follow-up with the neurologist.  Patient states he is aggravated that he is being discharged.  He states he will just get another opinion tomorrow.    We have discussed the diagnosis and risks, and we agree with discharging home to follow-up with appropriate physician as directed. We also discussed returning to the Emergency Department immediately if new or worsening symptoms occur. We have discussed the symptoms which are most concerning that necessitate immediate return. Pt symptoms have been well controlled here and the patient is safe for discharge with appropriate outpatient follow up. The patient has verbalized understanding to return to ER without delay for new or worsening pains or for any other symptoms or concerns. I utilized the discharge clinical management tool provided [employer] to help estimate risk of negative outcome for this patient.        Procedure  Procedures     Ludwig Billy PA-C  09/10/24 0024

## 2024-09-10 NOTE — ED TRIAGE NOTES
"Triage note:   Date of encounter: 9/9/2024  MRN: [id number]    I saw the patient as a clinician in triage and performed a brief history and physical exam established acuity and ordered appropriate tests developed basic plan of care.  Patient will be seen by ROSA and/or the physician who will independently evaluate the patient.  Please see subsequent provider notes for further details and disposition.     Brief HPI:   In brief, this is a 54-year-old male presenting to the emergency department with complaints of ongoing dizziness, blurred vision of the left eye, decreased hearing of the left ear and headache.  Symptoms started 1.5 weeks ago.  Patient states he was hospitalized at [facility] for the last 4 days, he states he was discharged today but he was still \"not feeling right\" which prompted him to come here for reevaluation.  Patient states he feels dizzy constantly not with movement only.  He does have nausea.  He states that his vision is blurry in the left eye but there is no double vision.  He states that he has on and off chest discomfort.    Focused physical exam:   General: Alert and oriented.  No acute distress  Vitals: See nursing note for vitals  HEENT: Normocephalic, atraumatic.  External ocular movements are intact.  Mucous membranes moist.   Neck: Soft and supple  Cardiovascular: Clear S1 and S2.  Regular rate and rhythm.   Pulmonary: Lungs are clear to auscultation bilaterally.  Symmetric rise and fall of chest wall.  MSK: Full active range of motion.  Ambulating on own with no difficulties  Neuro: Answering questions appropriately.  NIH stroke scale 1 due to ataxia of the left finger with finger-to-nose.  Psych: Cooperative with appropriate mood and affect.    Plan/MDM:     Plan for labs, CT angio of head and neck     Please see subsequent provider note for details and disposition  "

## 2025-06-04 ENCOUNTER — HOSPITAL ENCOUNTER (EMERGENCY)
Facility: HOSPITAL | Age: 55
Discharge: HOME | End: 2025-06-04
Attending: EMERGENCY MEDICINE
Payer: MEDICARE

## 2025-06-04 VITALS
HEART RATE: 76 BPM | TEMPERATURE: 98 F | SYSTOLIC BLOOD PRESSURE: 136 MMHG | RESPIRATION RATE: 16 BRPM | DIASTOLIC BLOOD PRESSURE: 80 MMHG | HEIGHT: 67 IN | OXYGEN SATURATION: 95 % | WEIGHT: 180 LBS | BODY MASS INDEX: 28.25 KG/M2

## 2025-06-04 DIAGNOSIS — L03.213 PRESEPTAL CELLULITIS: Primary | ICD-10-CM

## 2025-06-04 PROCEDURE — 99283 EMERGENCY DEPT VISIT LOW MDM: CPT | Performed by: EMERGENCY MEDICINE

## 2025-06-04 PROCEDURE — 99284 EMERGENCY DEPT VISIT MOD MDM: CPT

## 2025-06-04 PROCEDURE — 99284 EMERGENCY DEPT VISIT MOD MDM: CPT | Performed by: EMERGENCY MEDICINE

## 2025-06-04 RX ORDER — NEOMYCIN SULFATE, POLYMYXIN B SULFATE AND DEXAMETHASONE 3.5; 10000; 1 MG/ML; [USP'U]/ML; MG/ML
1 SUSPENSION/ DROPS OPHTHALMIC 2 TIMES DAILY
Qty: 1.4 ML | Refills: 0 | Status: SHIPPED | OUTPATIENT
Start: 2025-06-04 | End: 2025-06-18

## 2025-06-04 RX ORDER — DOXYCYCLINE 100 MG/1
100 CAPSULE ORAL 2 TIMES DAILY
Qty: 28 CAPSULE | Refills: 0 | Status: SHIPPED | OUTPATIENT
Start: 2025-06-04 | End: 2025-06-18

## 2025-06-04 NOTE — ED PROVIDER NOTES
HPI   Chief Complaint   Patient presents with    Eye Problem       HPI  Patient is a 55-year-old male who presents to the emergency department as a transfer from [facility] For an ophthalmology consultation for bilateral periorbital redness and swelling.  Patient states that his symptoms started approximate 4 days ago with some mild pain and eyelid swelling.  He states that since that time his symptoms have progressively worsened to the point that his eyes were almost swollen shut today.  He states he works as a  and could not take it anymore.  He states that he gets blurry vision on and off and it is making him feel uncomfortable while driving.  Patient states that he is having some crusting of his eyelids throughout the day, as well.  Patient states that he does not know what he was given at the outside hospital but thinks he was given antibiotics.  Patient denies significant pain with motion of his eyes.  Patient also denies any known trauma or history of allergies or similar symptoms.  Patient denies other systemic symptoms including chest pain, shortness of breath, abdominal pain, recent illnesses or fevers.      Patient History   Medical History[1]  Surgical History[2]  Family History[3]  Social History[4]    Physical Exam   ED Triage Vitals [06/04/25 1641]   Temperature Heart Rate Respirations BP   36.7 °C (98 °F) 80 16 (!) 148/94      Pulse Ox Temp Source Heart Rate Source Patient Position   97 % Oral Monitor --      BP Location FiO2 (%)     Right arm --       Physical Exam  Vitals and nursing note reviewed.   Constitutional:       General: He is not in acute distress.     Appearance: He is well-developed.   HENT:      Head: Normocephalic and atraumatic.   Eyes:      General: No visual field deficit or scleral icterus.        Right eye: No discharge.      Extraocular Movements: Extraocular movements intact.      Conjunctiva/sclera: Conjunctivae normal.      Comments: Bilateral periorbital  edema with mild tenderness to palpation  No visual field defects noted on exam   Cardiovascular:      Rate and Rhythm: Normal rate and regular rhythm.      Pulses: Normal pulses.      Heart sounds: No murmur heard.  Pulmonary:      Effort: Pulmonary effort is normal. No respiratory distress.      Breath sounds: Normal breath sounds.   Abdominal:      Palpations: Abdomen is soft.      Tenderness: There is no abdominal tenderness.   Musculoskeletal:         General: No swelling.      Cervical back: Neck supple.      Right lower leg: No edema.      Left lower leg: No edema.   Skin:     General: Skin is warm and dry.      Capillary Refill: Capillary refill takes less than 2 seconds.   Neurological:      Mental Status: He is alert.   Psychiatric:         Mood and Affect: Mood normal.       ED Course & MDM   ED Course as of 06/04/25 1838 Wed Jun 04, 2025 1815 Transfer from [facility] for ophthalmology consult for bilateral periorbital redness and swelling and discomfort.  Patient was evaluated by ophthalmology resident who assessed that presentation is more consistent with preseptal cellulitis and less concerning for orbital cellulitis and recommended doxycycline, next trauma, artificial tears, warm compresses and strict return precautions.  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 [MC]      ED Course User Index  [MC] Ant Marroquin MD         Diagnoses as of 06/04/25 1838   Preseptal cellulitis       Medical Decision Making  Patient is a 55-year-old male who presents to the emergency department as a transfer from [facility] For an ophthalmology consultation for bilateral periorbital redness and swelling.  Patient's vitals are stable and within normal [place] presentation.  Ophthalmology was consulted and evaluated the patient shortly after arrival.  They stated that their exam was consistent with  preseptal cellulitis after a thorough ophthalmology evaluation including dilation.  Upon the recommendation, patient was given prescriptions for doxycycline 100 mg p.o. twice daily x 2 weeks, twice daily OU x 2 weeks, artificial tears 4 times daily OU as needed, warm compresses, and pain control as needed with ibuprofen and Tylenol.  Patient was discharged in good condition with strict return precautions.  Patient recommended to follow-up with the ophthalmology clinic in 1 to 2 weeks with number provided.  Patient understood and is agreeable with the plan.    Procedure  Procedures none       [1] No past medical history on file.  [2]   Past Surgical History:  Procedure Laterality Date    HERNIA REPAIR     [3] No family history on file.  [4]   Social History  Tobacco Use    Smoking status: Every Day     Current packs/day: 1.00     Average packs/day: 1 pack/day for 37.0 years (37.0 ttl pk-yrs)     Types: Cigarettes    Smokeless tobacco: Never   Vaping Use    Vaping status: Never Used   Substance Use Topics    Alcohol use: Never    Drug use: Never        Jose Radford,   Resident  06/04/25 1931

## 2025-06-04 NOTE — CONSULTS
Reason For Consult  cellulitis    History Of Present Illness  Zoltan Blue is a 55 y.o. male  with no past ocular history presenting for bilateral periorbital swelling from outside hospital. He states that four days ago he started to notice some pain and eyelid swelling that has slowly progressed and worsened. He thinks that vision is slightly worse OU and he does not feel super comfortable driving. He has significant crusting and sicharged OU throughout the day. He received IV antibiotics at the outside hospital with significant improvement subjectively of eyelid edema.    He denies fevers, blanking out of vision, diplopia, flashes, floaters or other vision issues.     Past Medical History  He has no past medical history on file.    Surgical History  He has a past surgical history that includes Hernia repair.    Social History  He reports that he has been smoking cigarettes. He has a 37 pack-year smoking history. He has never used smokeless tobacco. He reports that he does not drink alcohol and does not use drugs.    Family History  Family History[1]     Allergies  Patient has no known allergies.    Review of Systems  As above     Physical Exam  Base Eye Exam       Visual Acuity (Snellen - Linear)         Right Left    Near sc 20/100 PH 20/50 20/80 PH 20/50+1              Tonometry (Tonopen, 4:58 PM)         Right Left    Pressure 16 18              Pupils         Dark Light Shape React APD    Right 5 3 Round Brisk None    Left 5 3 Round Brisk None              Visual Fields (Counting fingers)         Left Right     Full Full              Extraocular Movement         Right Left     Full Full   No diplopia with EOM in all directions             Neuro/Psych       Oriented x3: Yes              Dilation       Both eyes: 1% Mydriacyl & 2.5% Cahdd  @ 4:58 PM                  Slit Lamp and Fundus Exam       External Exam         Right Left    External Normal Normal              Slit Lamp Exam         Right Left  "   Lids/Lashes Trace soft edema of upper and lower eyelids Trace soft edema of upper and lower eyelids    Conjunctiva/Sclera 1+ injection diffuse, trace nonhemorrhagic chemosis 360 1+ injection diffuse, trace nonhemorrhagic chemosis 360    Cornea 3+ diffuse SPK, very oily tear film 3+ diffuse SPK, very oily tear film    Anterior Chamber Deep and quiet Deep and quiet    Iris Round and reactive Round and reactive    Lens Trace Nuclear sclerosis Trace Nuclear sclerosis    Anterior Vitreous Normal Normal              Fundus Exam         Right Left    Disc Normal, no edema or atrophy Normal, no edema or atrophy    C/D Ratio 0.40 0.40    Macula Normal, good foveal reflex Normal, good foveal reflex    Vessels Normal Normal    Periphery Normal Normal                     Last Recorded Vitals  Blood pressure (!) 148/94, pulse 80, temperature 36.7 °C (98 °F), temperature source Oral, resp. rate 16, height 1.702 m (5' 7\"), weight 81.6 kg (180 lb), SpO2 97%.    Relevant Results  CT orbits with contrast, personally reviewed and by report (pushed to PACS from [facility])  \"1. Significant thickening of the right maxillary sinus with  almost complete opacification obviously increased  compared to MRI from 11/26/2024. Minimal ethmoid  thickening. No fluid levels in the paranasal sinuses.  2. Mild symmetrical bilateral preorbital thickening.  Additional mild pre orbital thickening projecting between  the globes and the nasal bones mildly greater on the left.  No suspicious fluid collection. No suspicious intraorbital  soft tissue abnormality.  3. Very minimal dilatation of both optic nerve sheath.  Minimal empty sella. Cannot completely exclude  presence of a benign increased intracranial pressure.\"      Assessment/Plan   This 55M with no ocular history is presenting for cellulitis of both periorbital regions. Examination is reassuring for symmetric vision, normal IOP, no RAPD, normal color vision and full CVF OU. His EOMs are intact " and full without diplopia, albeit with slight pain in lateral gazes. His exam shows bilateral eyelid soft tissue swelling, injection and trace nonhemorrhagic chemosis. His AC is quiet, and his optic nerve examination is normal as well. CT imaging mainly demonstrates preseptal soft tissue edema in both eyes, as well as significant sinusitis.    Visual acuity OU is decreased, but it is symmetric and likely attributable to significant surface disease OU. There is minimal cataract formation at this point in his life, and he has a good foveal reflex and healthy appearing optic nerves OU.    Overall, his presentation is more c/w preseptal cellulitis and less concerning for orbital cellulitis given imaging findings and benign examination. Recommend treatment with oral antibiotics with strict return precautions for orbital cellulitis.    #Preseptal cellulitis OU  - Likely etiology is sinusitis as per CT imaging  - No EOM restriction, IOP/Color/Pupil reactions all reassuring on entrance testing   - No signs of optic neuropathy on complete eye examination  - Recommend doxycycline 100mg PO BID x2 weeks   - Recommend maxitrol diana BID OU x2 weeks  - Recommend artifical tears QID OU   - Recommend warm compresses to affected eye  - Pain control, fever control per primary team  - Strict return precautions provided to patient regarding fever, meningitic signs, decreasing vision, diplopia or worsening swelling of the eyelids to report immediately to [facility] ED or nearby equivalent for admission with IV antibiotics    - Recommend follow up with ophthalmology ( [facility] [place]) in 1-2 weeks with ophthalmology, please call to schedule. Number for follow up provided: [phone] (EYES)          Dl Masterson MD   Ophthalmology, PGY3    Ophthalmology Adult Pager - [phone]  Ophthalmology Pediatrics Pager - [phone]     For adult follow-up appointments, call: [phone]  For pediatric follow-up appointments, call: [phone]     Note not  finalized until attending signature.    Brief Key of Common Ophthalmology Abbreviations:  OD: right eye  OS: left eye  OU: both eyes  VA: visual acuity   IOP: intraocular pressure  EOMs: extraocular movements  CVF: confrontal visual fields  DFE: dilated fundus exam  DBH: dot blot hemorrhage  CWS: cotton wool spot  AC: anterior chamber  RD: retinal detachment  PVD: posterior vitreous detachment                   [1] No family history on file.

## 2025-06-04 NOTE — ED TRIAGE NOTES
Optho consult transfer from [facility]. Per PT he woke up this morning with bilateral eye swelling, redness and pain . No other complaints during triage.

## 2025-06-04 NOTE — DISCHARGE INSTRUCTIONS
You were seen in the emergency department today for concern of bilateral eye pain.  You have been diagnosed with preseptal cellulitis.  The following prescriptions and recommendations have been provided by ophthalmology.  Please take the medications as prescribed  - doxycycline 100mg PO BID x2 weeks   - maxitrol diana BID OU x2 weeks  - artifical tears QID OU   - Recommend warm compresses to affected eyes as needed  - You can take ibuprofen and/or Tylenol for pain control as needed  -You have been given strict return precautions regarding fever, meningitic signs, decreasing vision, diplopia or worsening swelling of the eyelids to report immediately to [facility] ED or nearby equivalent for admission with IV antibiotics     - Recommend follow up with ophthalmology ( [facility] [place]) in 1-2 weeks with ophthalmology, please call to schedule. Number for follow up provided: [phone] (EYES)

## 2025-06-24 ENCOUNTER — PATIENT OUTREACH (OUTPATIENT)
Dept: CARE COORDINATION | Facility: CLINIC | Age: 55
End: 2025-06-24
Payer: MEDICARE

## 2025-06-24 NOTE — PROGRESS NOTES
1st attempt:  This SW attempted to reach patient to assist with a smooth transition from most recent admission.  SW left VM for patient including contact info.      Linh Amato MSW LSW   [phone]   Placed referral on Cathy's desk.

## 2025-06-25 ENCOUNTER — PATIENT OUTREACH (OUTPATIENT)
Dept: CARE COORDINATION | Facility: CLINIC | Age: 55
End: 2025-06-25
Payer: MEDICARE

## 2025-06-25 NOTE — PROGRESS NOTES
2nd attempt:  This  attempted to reach patient to assist with a smooth transition from most recent admission.  This  left vm with contact info.  Unable to reach patient to complete gab assessment at this time.    Linh Amato MSW LSW   [phone]